# Patient Record
Sex: FEMALE | URBAN - METROPOLITAN AREA
[De-identification: names, ages, dates, MRNs, and addresses within clinical notes are randomized per-mention and may not be internally consistent; named-entity substitution may affect disease eponyms.]

---

## 2023-03-07 ENCOUNTER — NURSING HOME VISIT (OUTPATIENT)
Dept: POST ACUTE CARE | Facility: EXTERNAL LOCATION | Age: 73
End: 2023-03-07
Payer: MEDICARE

## 2023-03-07 DIAGNOSIS — G90.3 MULTIPLE SYSTEM ATROPHY WITH CEREBELLAR FEATURES (MULTI): ICD-10-CM

## 2023-03-07 DIAGNOSIS — G23.8 MULTIPLE SYSTEM ATROPHY WITH CEREBELLAR FEATURES (MULTI): ICD-10-CM
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
## 2023-03-07 PROBLEM — H35.30 ARMD (AGE RELATED MACULAR DEGENERATION): Status: ACTIVE | Noted: 2023-03-07

## 2023-03-07 PROBLEM — M54.50 LOW BACK PAIN: Status: ACTIVE | Noted: 2023-03-07

## 2023-03-07 PROBLEM — R52 PAIN: Status: ACTIVE | Noted: 2023-03-07

## 2023-03-07 PROBLEM — R27.0 ATAXIA: Status: ACTIVE | Noted: 2023-03-07

## 2023-03-07 PROBLEM — E55.9 VITAMIN D DEFICIENCY: Status: ACTIVE | Noted: 2023-03-07

## 2023-03-07 PROBLEM — E78.5 HLD (HYPERLIPIDEMIA): Status: ACTIVE | Noted: 2023-03-07

## 2023-03-07 PROCEDURE — 99348 HOME/RES VST EST LOW MDM 30: CPT | Performed by: NURSE PRACTITIONER

## 2023-03-07 NOTE — PROGRESS NOTES
Subjective   Patient ID: Lourdes Arechiga is a 72 y.o. female who is assisted living/ home patient being seen and evaluated for multiple medical problems.    Seen today after an accidental fall and no injuries         Review of Systems    Objective   There were no vitals taken for this visit.    Physical Exam  Vitals and nursing note reviewed.   Constitutional:       General: She is not in acute distress.  HENT:      Head: Normocephalic and atraumatic.   Eyes:      Pupils: Pupils are equal, round, and reactive to light.   Cardiovascular:      Rate and Rhythm: Normal rate and regular rhythm.   Pulmonary:      Effort: Pulmonary effort is normal.      Breath sounds: Normal breath sounds.   Musculoskeletal:         General: No swelling.      Cervical back: Normal range of motion.   Skin:     General: Skin is warm and dry.   Neurological:      General: No focal deficit present.      Mental Status: She is alert.   Psychiatric:         Mood and Affect: Mood normal.         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

## 2023-03-30 ENCOUNTER — NURSING HOME VISIT (OUTPATIENT)
Dept: POST ACUTE CARE | Facility: EXTERNAL LOCATION | Age: 73
End: 2023-03-30
Payer: MEDICARE

## 2023-03-30 VITALS
WEIGHT: 118.2 LBS | HEIGHT: 62 IN | HEART RATE: 80 BPM | TEMPERATURE: 97.5 F | DIASTOLIC BLOOD PRESSURE: 79 MMHG | SYSTOLIC BLOOD PRESSURE: 116 MMHG | BODY MASS INDEX: 21.75 KG/M2

## 2023-03-30 DIAGNOSIS — R53.81 DECLINING FUNCTIONAL STATUS: ICD-10-CM

## 2023-03-30 DIAGNOSIS — G90.3 MULTIPLE SYSTEM ATROPHY WITH CEREBELLAR FEATURES (MULTI): ICD-10-CM

## 2023-03-30 DIAGNOSIS — R27.0 ATAXIA: Primary | ICD-10-CM

## 2023-03-30 DIAGNOSIS — G23.8 MULTIPLE SYSTEM ATROPHY WITH CEREBELLAR FEATURES (MULTI): ICD-10-CM

## 2023-03-30 PROCEDURE — 99349 HOME/RES VST EST MOD MDM 40: CPT | Performed by: NURSE PRACTITIONER

## 2023-03-30 NOTE — LETTER
"Patient: Lourdes Arechiga  : 1950    Encounter Date: 2023    Subjective  Lourdes Arechiga is a 72 y.o. female who is assisted living/ home patient being seen and evaluated for multiple medical problems.    There were multiple medical problems reviewed.  No current issues or complaints. Medications, labs and orders reviewed. Patient seen and evaluated,   discussed with nursing staff,discussed with patient and provided information.     Resident with declining functional status at high risk for falls secondary to ataxia and multiple system atrophy. Requires a wheelchair to maintain her environmental and social MRADLS and decrease risk to fall and further costly medical costs or complications.              Objective  /79   Pulse 80   Temp 36.4 °C (97.5 °F)   Ht 1.575 m (5' 2\")   Wt 53.6 kg (118 lb 3.2 oz)   BMI 21.62 kg/m²     Physical Exam  Vitals and nursing note reviewed.   Constitutional:       General: She is not in acute distress.     Appearance: Normal appearance.   HENT:      Head: Normocephalic and atraumatic.   Pulmonary:      Effort: Pulmonary effort is normal.   Neurological:      Mental Status: She is alert and oriented to person, place, and time.   Psychiatric:         Mood and Affect: Mood normal.         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

## 2023-03-30 NOTE — PROGRESS NOTES
"Subjective   Lourdes Arechiga is a 72 y.o. female who is assisted living/ home patient being seen and evaluated for multiple medical problems.    There were multiple medical problems reviewed.  No current issues or complaints. Medications, labs and orders reviewed. Patient seen and evaluated,   discussed with nursing staff,discussed with patient and provided information.     Resident with declining functional status at high risk for falls secondary to ataxia and multiple system atrophy. Requires a wheelchair to maintain her environmental and social MRADLS and decrease risk to fall and further costly medical costs or complications.              Objective   /79   Pulse 80   Temp 36.4 °C (97.5 °F)   Ht 1.575 m (5' 2\")   Wt 53.6 kg (118 lb 3.2 oz)   BMI 21.62 kg/m²     Physical Exam  Vitals and nursing note reviewed.   Constitutional:       General: She is not in acute distress.     Appearance: Normal appearance.   HENT:      Head: Normocephalic and atraumatic.   Pulmonary:      Effort: Pulmonary effort is normal.   Neurological:      Mental Status: She is alert and oriented to person, place, and time.   Psychiatric:         Mood and Affect: Mood normal.         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

## 2023-07-06 ENCOUNTER — NURSING HOME VISIT (OUTPATIENT)
Dept: POST ACUTE CARE | Facility: EXTERNAL LOCATION | Age: 73
End: 2023-07-06
Payer: MEDICARE

## 2023-07-06 VITALS — DIASTOLIC BLOOD PRESSURE: 64 MMHG | HEART RATE: 75 BPM | SYSTOLIC BLOOD PRESSURE: 100 MMHG | TEMPERATURE: 98.1 F

## 2023-07-06 DIAGNOSIS — G90.3 MULTIPLE SYSTEM ATROPHY WITH CEREBELLAR FEATURES (MULTI): Primary | ICD-10-CM

## 2023-07-06 DIAGNOSIS — I95.0 IDIOPATHIC HYPOTENSION: ICD-10-CM

## 2023-07-06 DIAGNOSIS — R42 DIZZINESS ON STANDING: ICD-10-CM

## 2023-07-06 DIAGNOSIS — R53.81 DECLINING FUNCTIONAL STATUS: ICD-10-CM

## 2023-07-06 DIAGNOSIS — G23.8 MULTIPLE SYSTEM ATROPHY WITH CEREBELLAR FEATURES (MULTI): Primary | ICD-10-CM

## 2023-07-06 PROCEDURE — 99349 HOME/RES VST EST MOD MDM 40: CPT | Performed by: NURSE PRACTITIONER

## 2023-07-06 NOTE — LETTER
Patient: Lourdes Arechiga  : 1950    Encounter Date: 2023    Subjective  Lourdes Arechiga is a 72 y.o. female who is assisted living/ home patient being seen and evaluated for multiple medical problems.    Resident seen today for complaints of feeling dizzy and lightheaded when standing and when lying back down. She recently was seen by her cardiologist and started on Midodrine 2.5mg, she is not on any anti hypertensive medication. Her BP does run low, today 100/64. Discussed with resident, also she was educated on new medication    50% of time was spent on preparing to see the patient, performing exam or evaluation, coordination of care, ordering medications,tests and labs, referring and communicating with other health care providers , interpreting results, obtaining and reviewing history, tests, medications and documenting clinical information  EMR. Time spent >35 minutes              Objective  /64   Pulse 75   Temp 36.7 °C (98.1 °F)     Physical Exam  Vitals and nursing note reviewed.   Constitutional:       General: She is not in acute distress.     Appearance: Normal appearance.   HENT:      Head: Normocephalic and atraumatic.   Pulmonary:      Effort: Pulmonary effort is normal.   Neurological:      Mental Status: She is alert and oriented to person, place, and time.   Psychiatric:         Mood and Affect: Mood normal.         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          Other Visit Diagnoses       Dizziness on standing                  Electronically Signed By: WILBERTO Orellana   23  3:08 PM

## 2023-07-06 NOTE — ASSESSMENT & PLAN NOTE
Possibly neurogenic with PMH  Increase Midodrine to 5mg daily  Monitor orthostatic VS daily x 1 week then monitor BP daily.

## 2023-07-06 NOTE — PROGRESS NOTES
Subjective   Lourdes Arechiga is a 72 y.o. female who is assisted living/ home patient being seen and evaluated for multiple medical problems.    Resident seen today for complaints of feeling dizzy and lightheaded when standing and when lying back down. She recently was seen by her cardiologist and started on Midodrine 2.5mg, she is not on any anti hypertensive medication. Her BP does run low, today 100/64. Discussed with resident, also she was educated on new medication    50% of time was spent on preparing to see the patient, performing exam or evaluation, coordination of care, ordering medications,tests and labs, referring and communicating with other health care providers , interpreting results, obtaining and reviewing history, tests, medications and documenting clinical information  EMR. Time spent >35 minutes              Objective   /64   Pulse 75   Temp 36.7 °C (98.1 °F)     Physical Exam  Vitals and nursing note reviewed.   Constitutional:       General: She is not in acute distress.     Appearance: Normal appearance.   HENT:      Head: Normocephalic and atraumatic.   Pulmonary:      Effort: Pulmonary effort is normal.   Neurological:      Mental Status: She is alert and oriented to person, place, and time.   Psychiatric:         Mood and Affect: Mood normal.         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          Other Visit Diagnoses       Dizziness on standing

## 2023-08-15 ENCOUNTER — NURSING HOME VISIT (OUTPATIENT)
Dept: POST ACUTE CARE | Facility: EXTERNAL LOCATION | Age: 73
End: 2023-08-15
Payer: MEDICARE
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
## 2023-08-15 DIAGNOSIS — G23.8 MULTIPLE SYSTEM ATROPHY WITH CEREBELLAR FEATURES (MULTI): ICD-10-CM

## 2023-08-15 DIAGNOSIS — I95.0 IDIOPATHIC HYPOTENSION: ICD-10-CM

## 2023-08-15 DIAGNOSIS — G90.3 MULTIPLE SYSTEM ATROPHY WITH CEREBELLAR FEATURES (MULTI): ICD-10-CM

## 2023-08-15 DIAGNOSIS — Z91.89 AT RISK OF DECUBITUS ULCER: ICD-10-CM

## 2023-08-15 PROCEDURE — 99348 HOME/RES VST EST LOW MDM 30: CPT | Performed by: INTERNAL MEDICINE

## 2023-08-16 NOTE — PROGRESS NOTES
Follow-up visit  Patient is seen because of concern by the daughter of some possible skin breakdown on the mid upper back.  This was seen earlier that day at the ophthalmologist office when the patient presented for cataract surgery.  Patient also recently saw her neurologist regarding her ongoing problems of low blood pressure.    Medications and orders were reviewed with the staff and daughter.    Review of systems  Patient does admit to postural dizziness and weakness.  It is noted that she has become more unsteady and weaker over the past several weeks or longer.  She currently uses a walker but has difficulty ambulating even with this independently.  She denies any back pain.    Physical exam  Blood pressure is 88/65 seated, patient is afebrile.  Pulse is 75 pulse ox is 97% on room air.  She appears frail and a bit weak but no apparent distress alert and orient x3 and pleasant.  Lungs are clear.  Heart rate and rhythm is regular.  There is no peripheral edema.  Abdomen is soft nontender.  Neurologically demonstrates no focal deficits or tremors.  The patient's back was examined.  There is very faint slight small areas of patchy redness over the spinous processes of the mid thoracic spine.  No open areas.  No tenderness noted no swelling.    Assessment and care plan  Faint redness over the spinous processes of the mid thoracic spine from pressure.  The patient reportedly sits in the same position for prolonged periods of time in her recliner.  We have advised the staff to continue using foam padded dressings over this area.  To change every other day and as needed.  In addition we have advised the patient to try to adjust her position taking pressure off her mid back while seated in the chair.  We will continue to monitor this but currently the redness is quite faint and blanchable with no imminent threat of skin breakdown.  The patient has a history of multisystem atrophy with hypotension.  She is seeing her  neurologist regarding this.  She has been placed on midodrine once a day as well as a new medication called Northera 3 times a day.  We advised patient and her daughter that we will monitor this but have orthostatic blood pressures and routine vital signs faxed to the neurologist on a weekly basis so that that provider can adjust medications accordingly.  In the meantime we will ask for PT OT rehab services to assist the patient with her general weakness and gait.

## 2023-11-02 ENCOUNTER — NURSING HOME VISIT (OUTPATIENT)
Dept: POST ACUTE CARE | Facility: EXTERNAL LOCATION | Age: 73
End: 2023-11-02
Payer: MEDICARE

## 2023-11-02 VITALS
HEART RATE: 81 BPM | HEIGHT: 62 IN | BODY MASS INDEX: 21.53 KG/M2 | SYSTOLIC BLOOD PRESSURE: 126 MMHG | TEMPERATURE: 97.9 F | WEIGHT: 117 LBS | DIASTOLIC BLOOD PRESSURE: 72 MMHG

## 2023-11-02 DIAGNOSIS — G90.3 MULTIPLE SYSTEM ATROPHY WITH CEREBELLAR FEATURES (MULTI): ICD-10-CM

## 2023-11-02 DIAGNOSIS — R47.9 SPEECH DISTURBANCE, UNSPECIFIED TYPE: ICD-10-CM

## 2023-11-02 DIAGNOSIS — G23.8 MULTIPLE SYSTEM ATROPHY WITH CEREBELLAR FEATURES (MULTI): ICD-10-CM

## 2023-11-02 DIAGNOSIS — R53.81 DECLINING FUNCTIONAL STATUS: ICD-10-CM

## 2023-11-02 DIAGNOSIS — R27.0 ATAXIA: Primary | ICD-10-CM

## 2023-11-02 PROCEDURE — 99348 HOME/RES VST EST LOW MDM 30: CPT | Performed by: NURSE PRACTITIONER

## 2023-11-02 ASSESSMENT — ENCOUNTER SYMPTOMS
SHORTNESS OF BREATH: 0
FATIGUE: 0
CONSTIPATION: 0
WEAKNESS: 1
FEVER: 0
NERVOUS/ANXIOUS: 0
AGITATION: 0
COUGH: 0
LIGHT-HEADEDNESS: 0
NAUSEA: 0
SLEEP DISTURBANCE: 0
DIZZINESS: 0
VOICE CHANGE: 1
VOMITING: 0
DIARRHEA: 0

## 2023-11-02 NOTE — PROGRESS NOTES
"Chepe Arechiga is a 73 y.o. female who is assisted living/ home patient being seen and evaluated for multiple medical problems.    She has had a general physical decline and now dependent on her wheelchair. She was requesting some PT/OT and ST for increased weakness secondary to her multiple system atrophy with cerebellum features, ataxia. Her speech is also declining and possibly her swallowing.          Review of Systems   Constitutional:  Negative for fatigue and fever.   HENT:  Positive for voice change.    Respiratory:  Negative for cough and shortness of breath.    Cardiovascular:  Negative for chest pain and leg swelling.   Gastrointestinal:  Negative for constipation, diarrhea, nausea and vomiting.   Skin:  Negative for pallor and rash.   Neurological:  Positive for weakness. Negative for dizziness, syncope and light-headedness.   Psychiatric/Behavioral:  Negative for agitation, behavioral problems and sleep disturbance. The patient is not nervous/anxious.        Objective   /72   Pulse 81   Temp 36.6 °C (97.9 °F)   Ht 1.575 m (5' 2\")   Wt 53.1 kg (117 lb)   BMI 21.40 kg/m²     Physical Exam  Vitals and nursing note reviewed.   Constitutional:       General: She is not in acute distress.     Appearance: Normal appearance.   HENT:      Head: Normocephalic and atraumatic.   Cardiovascular:      Rate and Rhythm: Normal rate and regular rhythm.   Pulmonary:      Effort: Pulmonary effort is normal.      Breath sounds: Normal breath sounds.   Musculoskeletal:      Right lower leg: No edema.      Left lower leg: No edema.   Neurological:      Mental Status: She is alert and oriented to person, place, and time.   Psychiatric:         Mood and Affect: Mood normal.         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

## 2023-11-02 NOTE — LETTER
"Patient: Lourdes Arechiga  : 1950    Encounter Date: 2023    Subjective  Lourdes Arechiga is a 73 y.o. female who is assisted living/ home patient being seen and evaluated for multiple medical problems.    She has had a general physical decline and now dependent on her wheelchair. She was requesting some PT/OT and ST for increased weakness secondary to her multiple system atrophy with cerebellum features, ataxia. Her speech is also declining and possibly her swallowing.          Review of Systems   Constitutional:  Negative for fatigue and fever.   HENT:  Positive for voice change.    Respiratory:  Negative for cough and shortness of breath.    Cardiovascular:  Negative for chest pain and leg swelling.   Gastrointestinal:  Negative for constipation, diarrhea, nausea and vomiting.   Skin:  Negative for pallor and rash.   Neurological:  Positive for weakness. Negative for dizziness, syncope and light-headedness.   Psychiatric/Behavioral:  Negative for agitation, behavioral problems and sleep disturbance. The patient is not nervous/anxious.        Objective  /72   Pulse 81   Temp 36.6 °C (97.9 °F)   Ht 1.575 m (5' 2\")   Wt 53.1 kg (117 lb)   BMI 21.40 kg/m²     Physical Exam  Vitals and nursing note reviewed.   Constitutional:       General: She is not in acute distress.     Appearance: Normal appearance.   HENT:      Head: Normocephalic and atraumatic.   Cardiovascular:      Rate and Rhythm: Normal rate and regular rhythm.   Pulmonary:      Effort: Pulmonary effort is normal.      Breath sounds: Normal breath sounds.   Musculoskeletal:      Right lower leg: No edema.      Left lower leg: No edema.   Neurological:      Mental Status: She is alert and oriented to person, place, and time.   Psychiatric:         Mood and Affect: Mood normal.         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

## 2023-12-21 ENCOUNTER — NURSING HOME VISIT (OUTPATIENT)
Dept: POST ACUTE CARE | Facility: EXTERNAL LOCATION | Age: 73
End: 2023-12-21
Payer: MEDICARE

## 2023-12-21 DIAGNOSIS — G23.8 MULTIPLE SYSTEM ATROPHY WITH CEREBELLAR FEATURES (MULTI): Primary | ICD-10-CM

## 2023-12-21 DIAGNOSIS — G90.3 MULTIPLE SYSTEM ATROPHY WITH CEREBELLAR FEATURES (MULTI): Primary | ICD-10-CM

## 2023-12-21 DIAGNOSIS — R53.1 WEAKNESS GENERALIZED: ICD-10-CM

## 2023-12-21 DIAGNOSIS — R47.9 SPEECH DISTURBANCE, UNSPECIFIED TYPE: ICD-10-CM

## 2023-12-21 DIAGNOSIS — R53.81 DECLINING FUNCTIONAL STATUS: ICD-10-CM

## 2023-12-21 DIAGNOSIS — N30.00 ACUTE CYSTITIS WITHOUT HEMATURIA: ICD-10-CM

## 2023-12-21 PROCEDURE — 99349 HOME/RES VST EST MOD MDM 40: CPT | Performed by: NURSE PRACTITIONER

## 2023-12-21 ASSESSMENT — ENCOUNTER SYMPTOMS
FEVER: 0
WEAKNESS: 1

## 2023-12-21 NOTE — LETTER
Patient: Lourdes Arechiga  : 1950    Encounter Date: 2023    Subjective  Lourdes Arechiga is a 73 y.o. female who is assisted living/ home patient being seen and evaluated for multiple medical problems.    Seen today after she returned from a hospitalization on , she was diagnosed with sepsis secondary to UTI. Now on oral Macrobid. She does state she still feels very weak, she has been declining physically lately and per staff is requiring a 2 person assist         Review of Systems   Constitutional:  Negative for fever.   Neurological:  Positive for weakness.       Objective      Physical Exam  Vitals and nursing note reviewed.   Constitutional:       General: She is not in acute distress.     Appearance: Normal appearance.   HENT:      Head: Normocephalic and atraumatic.   Cardiovascular:      Rate and Rhythm: Normal rate and regular rhythm.   Pulmonary:      Effort: Pulmonary effort is normal.      Breath sounds: Normal breath sounds.   Musculoskeletal:      Right lower leg: No edema.      Left lower leg: No edema.   Neurological:      Mental Status: She is alert and oriented to person, place, and time.   Psychiatric:         Mood and Affect: Mood normal.         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 PT/OT         Speech abnormality    Weakness generalized     Other Visit Diagnoses       Acute cystitis without hematuria        continue macrobid as ordered  Check CBC and BMP 24            Electronically Signed By: NORM Orellana-CNP   23  3:59 PM

## 2023-12-21 NOTE — PROGRESS NOTES
Subjective   Lourdes Arechiga is a 73 y.o. female who is assisted living/ home patient being seen and evaluated for multiple medical problems.    Seen today after she returned from a hospitalization on 12/16, she was diagnosed with sepsis secondary to UTI. Now on oral Macrobid. She does state she still feels very weak, she has been declining physically lately and per staff is requiring a 2 person assist         Review of Systems   Constitutional:  Negative for fever.   Neurological:  Positive for weakness.       Objective       Physical Exam  Vitals and nursing note reviewed.   Constitutional:       General: She is not in acute distress.     Appearance: Normal appearance.   HENT:      Head: Normocephalic and atraumatic.   Cardiovascular:      Rate and Rhythm: Normal rate and regular rhythm.   Pulmonary:      Effort: Pulmonary effort is normal.      Breath sounds: Normal breath sounds.   Musculoskeletal:      Right lower leg: No edema.      Left lower leg: No edema.   Neurological:      Mental Status: She is alert and oriented to person, place, and time.   Psychiatric:         Mood and Affect: Mood normal.         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 PT/OT         Speech abnormality    Weakness generalized     Other Visit Diagnoses       Acute cystitis without hematuria        continue macrobid as ordered  Check CBC and BMP 1/9/24

## 2024-01-03 ENCOUNTER — NURSING HOME VISIT (OUTPATIENT)
Dept: POST ACUTE CARE | Facility: EXTERNAL LOCATION | Age: 74
End: 2024-01-03
Payer: MEDICARE

## 2024-01-03 DIAGNOSIS — G23.8 MULTIPLE SYSTEM ATROPHY WITH CEREBELLAR FEATURES (MULTI): Primary | ICD-10-CM

## 2024-01-03 DIAGNOSIS — L89.101 PRESSURE INJURY OF BACK, STAGE 1: ICD-10-CM

## 2024-01-03 DIAGNOSIS — G90.3 MULTIPLE SYSTEM ATROPHY WITH CEREBELLAR FEATURES (MULTI): Primary | ICD-10-CM

## 2024-01-03 DIAGNOSIS — I95.1 ORTHOSTATIC HYPOTENSION: ICD-10-CM

## 2024-01-03 DIAGNOSIS — R53.81 DECLINING FUNCTIONAL STATUS: ICD-10-CM

## 2024-01-03 DIAGNOSIS — R53.1 WEAKNESS GENERALIZED: ICD-10-CM

## 2024-01-03 PROCEDURE — 99348 HOME/RES VST EST LOW MDM 30: CPT | Performed by: INTERNAL MEDICINE

## 2024-01-03 NOTE — LETTER
Patient: Lourdes Arechiga  : 1950    Encounter Date: 2024    Follow-up visit  Staff reports that the patient is continue to decline and weakness.  She is now at least a two-person assist and transferring.  She still complains of postural dizziness.    Patient recently saw the urologist in follow-up to her problems with urosepsis few weeks ago.  Apparently there are some persisting issues of urinary retention and they may elect to put a Lopez catheter in in the near future.    Staff also reports a reddened area on the mid back area likely due to prolonged sitting in a recliner chair.    Medications orders and labs are reviewed.    Review of systems  No other complaints reported than above.  No complaint of shortness of breath fever chills or malaise no GI distress no nausea vomiting or diarrhea.  No abdominal pain.  No back pain.    Physical exam  Blood pressure is 115/87 in the seated position.  Supine the patient was 152/98.  Pulse of 76 pulse ox is 96% on room temp 97.8.  She is alert oriented but appears quite frail and weak.  Lungs are clear.  Heart rate and rhythm is regular.  Abdomen is soft nontender.  There is very slight pedal edema.  On her back there is a faint reddened area over the lower thoracic spinal processes.  No open areas or tenderness.    Assessment and care plan  Adult failure to thrive with significant generalized weakness and frailty.  There is also evidence of orthostatic hypotension consistent with the patient's history of multisystem atrophy.  She is already on midodrine 5 mg 3 times a day and in [facility] 100 mg 3 times a day.  Given her supine blood pressures we will continue the current dose and await follow-up with neurology in the near future as scheduled.    Given the patient's functional decline and generalized weakness we are in agreement with recent plans to consider transferring her to more of an extended care facility that can provide additional support    Reddened  area on her back is a very early pressure decubitus.  We will ask the staff to pad and protect and monitor      Electronically Signed By: Wilfrido Hodges DO   1/3/24  1:06 PM

## 2024-01-03 NOTE — PROGRESS NOTES
Follow-up visit  Staff reports that the patient is continue to decline and weakness.  She is now at least a two-person assist and transferring.  She still complains of postural dizziness.    Patient recently saw the urologist in follow-up to her problems with urosepsis few weeks ago.  Apparently there are some persisting issues of urinary retention and they may elect to put a Lopez catheter in in the near future.    Staff also reports a reddened area on the mid back area likely due to prolonged sitting in a recliner chair.    Medications orders and labs are reviewed.    Review of systems  No other complaints reported than above.  No complaint of shortness of breath fever chills or malaise no GI distress no nausea vomiting or diarrhea.  No abdominal pain.  No back pain.    Physical exam  Blood pressure is 115/87 in the seated position.  Supine the patient was 152/98.  Pulse of 76 pulse ox is 96% on room temp 97.8.  She is alert oriented but appears quite frail and weak.  Lungs are clear.  Heart rate and rhythm is regular.  Abdomen is soft nontender.  There is very slight pedal edema.  On her back there is a faint reddened area over the lower thoracic spinal processes.  No open areas or tenderness.    Assessment and care plan  Adult failure to thrive with significant generalized weakness and frailty.  There is also evidence of orthostatic hypotension consistent with the patient's history of multisystem atrophy.  She is already on midodrine 5 mg 3 times a day and in [place] [facility] 100 mg 3 times a day.  Given her supine blood pressures we will continue the current dose and await follow-up with neurology in the near future as scheduled.    Given the patient's functional decline and generalized weakness we are in agreement with recent plans to consider transferring her to more of an extended care facility that can provide additional support    Reddened area on her back is a very early pressure decubitus.  We will ask the  staff to pad and protect and monitor

## 2024-01-06 PROCEDURE — G0179 MD RECERTIFICATION HHA PT: HCPCS | Performed by: INTERNAL MEDICINE
